# Patient Record
Sex: FEMALE | Race: WHITE | Employment: OTHER
[De-identification: names, ages, dates, MRNs, and addresses within clinical notes are randomized per-mention and may not be internally consistent; named-entity substitution may affect disease eponyms.]

---

## 2017-10-08 ENCOUNTER — HEALTH MAINTENANCE LETTER (OUTPATIENT)
Age: 57
End: 2017-10-08

## 2020-10-01 ENCOUNTER — OFFICE VISIT (OUTPATIENT)
Dept: URGENT CARE | Facility: URGENT CARE | Age: 60
End: 2020-10-01

## 2020-10-01 VITALS
TEMPERATURE: 98.2 F | SYSTOLIC BLOOD PRESSURE: 134 MMHG | DIASTOLIC BLOOD PRESSURE: 81 MMHG | OXYGEN SATURATION: 97 % | WEIGHT: 182 LBS | HEART RATE: 75 BPM

## 2020-10-01 DIAGNOSIS — H61.22 IMPACTED CERUMEN OF LEFT EAR: Primary | ICD-10-CM

## 2020-10-01 PROCEDURE — 69209 REMOVE IMPACTED EAR WAX UNI: CPT | Mod: LT | Performed by: PHYSICIAN ASSISTANT

## 2020-10-01 ASSESSMENT — ENCOUNTER SYMPTOMS
TROUBLE SWALLOWING: 0
CONSTITUTIONAL NEGATIVE: 1
RHINORRHEA: 0
RESPIRATORY NEGATIVE: 1
CARDIOVASCULAR NEGATIVE: 1
SORE THROAT: 0
FACIAL SWELLING: 0
MUSCULOSKELETAL NEGATIVE: 1
SINUS PRESSURE: 0
SINUS PAIN: 0
PSYCHIATRIC NEGATIVE: 1
VOICE CHANGE: 0
GASTROINTESTINAL NEGATIVE: 1

## 2020-10-02 NOTE — PROGRESS NOTES
SUBJECTIVE:   Yazmin Torres is a 60 year old female presenting with a chief complaint of   Chief Complaint   Patient presents with     Urgent Care     Ear Problem     c/o ear pain for 1 week       She is an established patient of [facility].    Onset of symptoms was 1 week(s) ago.  Course of illness is worsening.    Severity moderate  Current and Associated symptoms: ear pain left  Treatment measures tried include None tried.  Predisposing factors include None.        Review of Systems   Constitutional: Negative.    HENT: Positive for ear pain. Negative for congestion, dental problem, drooling, ear discharge, facial swelling, hearing loss, mouth sores, nosebleeds, postnasal drip, rhinorrhea, sinus pressure, sinus pain, sneezing, sore throat, tinnitus, trouble swallowing and voice change.    Respiratory: Negative.    Cardiovascular: Negative.    Gastrointestinal: Negative.    Genitourinary: Negative.    Musculoskeletal: Negative.    Psychiatric/Behavioral: Negative.        No past medical history on file.  No family history on file.  No current outpatient medications on file.     Social History     Tobacco Use     Smoking status: Never Smoker     Smokeless tobacco: Never Used   Substance Use Topics     Alcohol use: Not on file       OBJECTIVE  /81   Pulse 75   Temp 98.2  F (36.8  C) (Tympanic)   Wt 82.6 kg (182 lb)   SpO2 97%     Physical Exam  Constitutional:       General: She is not in acute distress.     Appearance: Normal appearance. She is normal weight. She is not ill-appearing, toxic-appearing or diaphoretic.   HENT:      Head: Normocephalic and atraumatic.      Right Ear: Tympanic membrane, ear canal and external ear normal. There is no impacted cerumen.      Left Ear: Tympanic membrane, ear canal and external ear normal. There is impacted cerumen.      Ears:      Comments: Left TM and ear canal appeared to be normal after cerumen was removed.   Cardiovascular:      Rate and Rhythm: Normal rate and  regular rhythm.      Pulses: Normal pulses.      Heart sounds: Normal heart sounds. No murmur. No friction rub. No gallop.    Pulmonary:      Effort: Pulmonary effort is normal. No respiratory distress.      Breath sounds: Normal breath sounds.   Neurological:      Mental Status: She is alert.   Psychiatric:         Mood and Affect: Mood normal.         Behavior: Behavior normal.         Thought Content: Thought content normal.         Judgment: Judgment normal.         ASSESSMENT/PLAN:    (H61.22) Impacted cerumen of left ear  (primary encounter diagnosis)  Plan: Left ear was irrigated with a water and hydrogen peroxide mix. Patient tolerated procedure well and without complication.     Okay to take acetaminophen 500 mg- 2 tabs (Total of 1000 mg) every 8 hrs   Okay to take ibuprofen 200 mg- 3 tabs (Total of 600 mg) every 6 hours      Last Jovel PA-C on 10/1/2020 at 9:24 PM